# Patient Record
Sex: FEMALE | Race: WHITE | Employment: OTHER | URBAN - NONMETROPOLITAN AREA
[De-identification: names, ages, dates, MRNs, and addresses within clinical notes are randomized per-mention and may not be internally consistent; named-entity substitution may affect disease eponyms.]

---

## 2018-02-23 ENCOUNTER — HOSPITAL ENCOUNTER (EMERGENCY)
Age: 68
Discharge: HOME | End: 2018-02-23
Payer: MEDICARE

## 2018-02-23 VITALS
HEART RATE: 87 BPM | WEIGHT: 168 LBS | DIASTOLIC BLOOD PRESSURE: 69 MMHG | BODY MASS INDEX: 27.96 KG/M2 | SYSTOLIC BLOOD PRESSURE: 152 MMHG | RESPIRATION RATE: 18 BRPM | TEMPERATURE: 97.9 F | OXYGEN SATURATION: 98 %

## 2018-02-23 DIAGNOSIS — E86.0 DEHYDRATION: ICD-10-CM

## 2018-02-23 DIAGNOSIS — H61.23 BILATERAL IMPACTED CERUMEN: ICD-10-CM

## 2018-02-23 DIAGNOSIS — A09 ACUTE INFECTIVE GASTROENTERITIS: Primary | ICD-10-CM

## 2018-02-23 LAB
BILIRUBIN URINE: ABNORMAL
BLOOD, URINE: NEGATIVE
CHARACTER, URINE: CLEAR
COLOR: YELLOW
GLUCOSE, URINE: NEGATIVE MG/DL
KETONES, URINE: NEGATIVE
LEUKOCYTES, UA: NEGATIVE
NITRATE, UA: NEGATIVE
PH UA: 5 (ref 5–9)
PROTEIN UA: ABNORMAL MG/DL
REFLEX TO URINE C & S: ABNORMAL
SPECIFIC GRAVITY UA: >= 1.03 (ref 1–1.03)
UROBILINOGEN, URINE: 0.2 EU/DL (ref 0–1)

## 2018-02-23 PROCEDURE — 99202 OFFICE O/P NEW SF 15 MIN: CPT | Performed by: NURSE PRACTITIONER

## 2018-02-23 PROCEDURE — 81003 URINALYSIS AUTO W/O SCOPE: CPT

## 2018-02-23 PROCEDURE — 99214 OFFICE O/P EST MOD 30 MIN: CPT

## 2018-02-23 PROCEDURE — 6360000002 HC RX W HCPCS: Performed by: NURSE PRACTITIONER

## 2018-02-23 RX ORDER — CEFDINIR 300 MG/1
300 CAPSULE ORAL 2 TIMES DAILY
Qty: 14 CAPSULE | Refills: 0 | Status: SHIPPED | OUTPATIENT
Start: 2018-02-23 | End: 2018-03-02

## 2018-02-23 RX ORDER — SACCHAROMYCES BOULARDII 250 MG
250 CAPSULE ORAL DAILY
Qty: 14 CAPSULE | Refills: 0 | Status: SHIPPED | OUTPATIENT
Start: 2018-02-23 | End: 2018-03-09

## 2018-02-23 RX ORDER — HYDROCHLOROTHIAZIDE 12.5 MG/1
12.5 CAPSULE, GELATIN COATED ORAL DAILY
COMMUNITY
End: 2021-09-11

## 2018-02-23 RX ORDER — IBUPROFEN 400 MG/1
400 TABLET ORAL EVERY 6 HOURS PRN
COMMUNITY

## 2018-02-23 RX ORDER — ONDANSETRON 4 MG/1
4 TABLET, ORALLY DISINTEGRATING ORAL EVERY 8 HOURS PRN
Qty: 15 TABLET | Refills: 0 | Status: SHIPPED | OUTPATIENT
Start: 2018-02-23 | End: 2018-02-28

## 2018-02-23 RX ORDER — SUCRALFATE 1 G/1
1 TABLET ORAL 4 TIMES DAILY
Qty: 20 TABLET | Refills: 0 | Status: SHIPPED | OUTPATIENT
Start: 2018-02-23 | End: 2021-09-11

## 2018-02-23 RX ORDER — ONDANSETRON 4 MG/1
4 TABLET, ORALLY DISINTEGRATING ORAL ONCE
Status: COMPLETED | OUTPATIENT
Start: 2018-02-23 | End: 2018-02-23

## 2018-02-23 RX ADMIN — ONDANSETRON 4 MG: 4 TABLET, ORALLY DISINTEGRATING ORAL at 19:04

## 2018-02-23 ASSESSMENT — ENCOUNTER SYMPTOMS
RHINORRHEA: 1
RECENT COUGH: 1
WHEEZING: 0
COUGH: 1
ABDOMINAL DISTENTION: 0
CONSTIPATION: 0
ABDOMINAL PAIN: 1
BLOOD IN STOOL: 0
DIARRHEA: 1
RECTAL PAIN: 0
SORE THROAT: 0
VOMITING: 0
NAUSEA: 1
STRIDOR: 0
ANAL BLEEDING: 0
CHOKING: 0
CHEST TIGHTNESS: 0
APNEA: 0
SHORTNESS OF BREATH: 0

## 2018-02-23 ASSESSMENT — PAIN SCALES - GENERAL: PAINLEVEL_OUTOF10: 9

## 2018-02-23 ASSESSMENT — PAIN DESCRIPTION - FREQUENCY: FREQUENCY: CONTINUOUS

## 2018-02-23 ASSESSMENT — PAIN DESCRIPTION - DESCRIPTORS: DESCRIPTORS: ACHING

## 2018-02-23 ASSESSMENT — PAIN DESCRIPTION - PAIN TYPE: TYPE: ACUTE PAIN

## 2018-02-23 NOTE — ED PROVIDER NOTES
generalized tenderness. There is no rigidity, no rebound, no guarding and no CVA tenderness. Musculoskeletal: Normal range of motion. Neurological: She is alert and oriented to person, place, and time. Skin: Skin is warm and dry. She is not diaphoretic. Psychiatric: She has a normal mood and affect. Her behavior is normal. Judgment and thought content normal.   Nursing note and vitals reviewed. DIAGNOSTIC RESULTS   Labs:  Results for orders placed or performed during the hospital encounter of 02/23/18   UA without Microscopic, Reflex C&S   Result Value Ref Range    Glucose, Urine Negative NEGATIVE mg/dl    Bilirubin Urine Small (A) NEGATIVE    Ketones, Urine Negative NEGATIVE    Specific Gravity, UA >=1.030 1.002 - 1.03    Blood, Urine Negative NEGATIVE    pH, UA 5.00 5.0 - 9.0    Protein, UA Trace (A) NEGATIVE mg/dl    Urobilinogen, Urine 0.20 0.0 - 1.0 eu/dl    Nitrate, UA Negative NEGATIVE    LEUKOCYTES, UA Negative NEGATIVE    Color, UA Yellow STRAW-YELL    Character, Urine Clear CLEAR-SL C    REFLEX TO URINE C & S NOT INDICATED        IMAGING:  No orders to display     URGENT CARE COURSE:     Vitals:    02/23/18 1831   BP: (!) 152/69   Pulse: 87   Resp: 18   Temp: 97.9 °F (36.6 °C)   TempSrc: Oral   SpO2: 98%   Weight: 168 lb (76.2 kg)       Medications   ondansetron (ZOFRAN-ODT) disintegrating tablet 4 mg (4 mg Oral Given 2/23/18 1904)     PROCEDURES:  None  FINAL IMPRESSION      1. Acute infective gastroenteritis    2. Dehydration    3. Bilateral impacted cerumen        DISPOSITION/PLAN      These symptoms are consistent with infective gastroenteritis. I recommend Clear Liquids only next 12-24 hours. If tolerated then BRAT Diet .   Advise the patient that if worsening symptoms such as more than 6 stools per day, not voiding regularly, persistent vomiting not relieved with medication,unable to take oral fluids, high fever, severe weakness, lightheadedness or fainting, dry mucous membranes or other

## 2018-02-23 NOTE — ED TRIAGE NOTES
Pt ambulatory into esuc with c/o headache,nausea,fatigue, body aches and diarrhea for the past four days. Pt states she has had about 6 diarrhea in the past 24 hours. Pt states body aches is a pain of 9.

## 2021-09-11 ENCOUNTER — HOSPITAL ENCOUNTER (EMERGENCY)
Age: 71
Discharge: HOME | End: 2021-09-11
Payer: MEDICARE

## 2021-09-11 VITALS
HEIGHT: 65 IN | OXYGEN SATURATION: 99 % | BODY MASS INDEX: 32.32 KG/M2 | RESPIRATION RATE: 18 BRPM | SYSTOLIC BLOOD PRESSURE: 145 MMHG | HEART RATE: 98 BPM | WEIGHT: 194 LBS | DIASTOLIC BLOOD PRESSURE: 74 MMHG | TEMPERATURE: 98.1 F

## 2021-09-11 DIAGNOSIS — K52.9 GASTROENTERITIS: Primary | ICD-10-CM

## 2021-09-11 DIAGNOSIS — J40 BRONCHITIS: ICD-10-CM

## 2021-09-11 LAB — SARS-COV-2, NAA: NOT  DETECTED

## 2021-09-11 PROCEDURE — 87635 SARS-COV-2 COVID-19 AMP PRB: CPT

## 2021-09-11 PROCEDURE — 99203 OFFICE O/P NEW LOW 30 MIN: CPT

## 2021-09-11 PROCEDURE — 99203 OFFICE O/P NEW LOW 30 MIN: CPT | Performed by: NURSE PRACTITIONER

## 2021-09-11 RX ORDER — METHYLPREDNISOLONE 4 MG/1
TABLET ORAL
Qty: 1 KIT | Refills: 0 | Status: SHIPPED | OUTPATIENT
Start: 2021-09-11 | End: 2021-09-17

## 2021-09-11 RX ORDER — CEFDINIR 300 MG/1
300 CAPSULE ORAL 2 TIMES DAILY
Qty: 20 CAPSULE | Refills: 0 | Status: SHIPPED | OUTPATIENT
Start: 2021-09-11 | End: 2021-09-21

## 2021-09-11 ASSESSMENT — ENCOUNTER SYMPTOMS
VOMITING: 0
COUGH: 1
RHINORRHEA: 0
NAUSEA: 1
SHORTNESS OF BREATH: 0
WHEEZING: 1
DIARRHEA: 1
ABDOMINAL DISTENTION: 0
CHEST TIGHTNESS: 0
SINUS PRESSURE: 0
ABDOMINAL PAIN: 0

## 2021-09-11 NOTE — ED PROVIDER NOTES
[phone]       Chief Complaint   Patient presents with    Cough    Diarrhea    Nausea       Nurses Notes reviewed and I agree except as noted in the HPI. HISTORY OF PRESENT ILLNESS   Leslee Chand is a 66 y.o. female who presents Pt with covid like symptoms through the week with cough, congestion, sinus pressure and pain. Now with diarrhea and nausea x 2 days. Watery diarrhea. The history is provided by the patient. Diarrhea  Quality:  Mucous and watery  Severity:  Moderate  Onset quality:  Sudden  Duration:  2 days  Timing:  Intermittent  Progression:  Unchanged  Relieved by:  Nothing  Worsened by:  Change in diet and liquids  Ineffective treatments:  Liquids and change in diet  Associated symptoms: headaches    Associated symptoms: no abdominal pain and no vomiting    Associated symptoms comment:  Nausea    Headaches:     Severity:  Moderate    Onset quality:  Gradual    Duration:  1 week    Timing:  Intermittent    Progression:  Waxing and waning    Chronicity:  New  Risk factors: sick contacts    Risk factors: no recent antibiotic use, no suspicious food intake and no travel to endemic areas          REVIEW OF SYSTEMS     Review of Systems   Constitutional: Positive for activity change, appetite change and fatigue. HENT: Positive for congestion. Negative for postnasal drip, rhinorrhea, sinus pressure and sneezing. Respiratory: Positive for cough and wheezing. Negative for chest tightness and shortness of breath. Cardiovascular: Negative. Gastrointestinal: Positive for diarrhea and nausea. Negative for abdominal distention, abdominal pain and vomiting. Endocrine: Negative. Genitourinary: Negative. Musculoskeletal: Negative. Skin: Negative. Allergic/Immunologic: Negative for environmental allergies and food allergies. Neurological: Positive for headaches. Negative for weakness.    Hematological: Negative for adenopathy. Does not bruise/bleed easily. Psychiatric/Behavioral: Negative. PAST MEDICAL HISTORY         Diagnosis Date    Anxiety     Depression     Hyperlipidemia        SURGICAL HISTORY     Patient  has a past surgical history that includes knee surgery; back surgery; Hysterectomy; Cholecystectomy; and Carpal tunnel release. CURRENT MEDICATIONS       Discharge Medication List as of 9/11/2021  8:53 AM      CONTINUE these medications which have NOT CHANGED    Details   NONFORMULARY Medication for fibromyalgiaHistorical Med      bismuth subsalicylate (PEPTO BISMOL) 262 MG/15ML suspension Take 30 mLs by mouth every 6 hours as needed for IndigestionHistorical Med      ibuprofen (ADVIL;MOTRIN) 400 MG tablet Take 400 mg by mouth every 6 hours as needed for PainHistorical Med      venlafaxine (EFFEXOR-XR) 150 MG XR capsule Take 150 mg by mouth daily Pt takes two pills in am, Disp-60 capsule, R-5Historical Med      clorazepate (TRANXENE) 15 MG tablet Take 15 mg by mouth nightlyHistorical Med      famotidine (PEPCID) 20 MG tablet Take 20 mg by mouth daily      zolpidem (AMBIEN) 10 MG tablet Take by mouth nightly as needed for Sleep             ALLERGIES     Patient is has No Known Allergies. FAMILY HISTORY     Patient'sfamily history is not on file. SOCIAL HISTORY     Patient  reports that she has been smoking cigarettes. She has been smoking about 0.50 packs per day. She has never used smokeless tobacco. She reports that she does not drink alcohol and does not use drugs. PHYSICAL EXAM     ED TRIAGE VITALS  BP: (!) 145/74, Temp: 98.1 °F (36.7 °C), Pulse: 98, Resp: 18, SpO2: 99 %  Physical Exam  Vitals and nursing note reviewed. Constitutional:       Appearance: She is obese. She is ill-appearing. HENT:      Head: Normocephalic.       Right Ear: Tympanic membrane, ear canal and external ear normal.      Left Ear: Tympanic membrane, ear canal and external ear normal.      Nose: Nose normal. No rhinorrhea. Mouth/Throat:      Pharynx: Oropharynx is clear. Eyes:      Conjunctiva/sclera: Conjunctivae normal.   Cardiovascular:      Rate and Rhythm: Normal rate and regular rhythm. Pulses: Normal pulses. Pulmonary:      Effort: Pulmonary effort is normal.      Breath sounds: Wheezing ( bilateal upper airways) present. Abdominal:      General: Abdomen is flat. Bowel sounds are normal. There is distension. Palpations: There is no mass. Tenderness: There is no abdominal tenderness. There is no guarding. Musculoskeletal:         General: Normal range of motion. Cervical back: Normal range of motion and neck supple. No tenderness. Lymphadenopathy:      Cervical: No cervical adenopathy. Skin:     General: Skin is warm and dry. Capillary Refill: Capillary refill takes 2 to 3 seconds. Coloration: Skin is pale. Neurological:      General: No focal deficit present. Mental Status: She is alert and oriented to person, place, and time. Mental status is at baseline. Psychiatric:         Mood and Affect: Mood normal.         Behavior: Behavior normal.         Thought Content: Thought content normal.         Judgment: Judgment normal.         DIAGNOSTIC RESULTS   Labs:   Results for orders placed or performed during the hospital encounter of 09/11/21   COVID-19, Rapid   Result Value Ref Range    SARS-CoV-2, MAYDA NOT  DETECTED NOT DETECTED       IMAGING:  No orders to display     URGENT CARE COURSE:     Vitals:    09/11/21 0810   BP: (!) 145/74   Pulse: 98   Resp: 18   Temp: 98.1 °F (36.7 °C)   TempSrc: Temporal   SpO2: 99%   Weight: 194 lb (88 kg)   Height: 5' 5\" (1.651 m)       Medications - No data to display  PROCEDURES:  None  FINALIMPRESSION    I have reviewed the patient's medical history in detail and updated the computerized patient record. HPI/ROS per the patient and caregiver. Overall non toxic in appearance. Answers questions appropriately.    Conditions discussed and addressed this visit include:     Patient appears ill but non-toxic and fairly well hydrated. Patient did test neg for covid 19. Patient is to take medications as directed. Continue all home medications. Potential side effects of the medications were reviewed with the patient in detail. The patient can increase fluids. Discussed using electrolyte replacement. The patient can have Tylenol or Motrin for pain and fever as needed. Discussed with patient signs and symptoms that would require emergent evaluation and treatment. The patient will follow-up with their family physician or go to the ER if they develop any worsening symptoms. The patient was discharged in stable condition      1. Gastroenteritis    2.  Bronchitis        DISPOSITION/PLAN   DISPOSITION      PATIENT REFERRED TO:  Kathy Valdovinos MD  [address] [place] [address]  [phone]    In 3 days  As needed, If symptoms worsen    DISCHARGE MEDICATIONS:  Discharge Medication List as of 9/11/2021  8:53 AM      START taking these medications    Details   cefdinir (OMNICEF) 300 MG capsule Take 1 capsule by mouth 2 times daily for 10 days, Disp-20 capsule, R-0Normal      methylPREDNISolone (MEDROL, GINNA,) 4 MG tablet Take by mouth., Disp-1 kit, R-0Normal           Discharge Medication List as of 9/11/2021  8:53 AM          RUDY Sen CNP, APRN - CNP  09/13/21 5743

## 2022-05-20 ENCOUNTER — HOSPITAL ENCOUNTER (OUTPATIENT)
Age: 72
Discharge: HOME | End: 2022-05-20
Payer: MEDICARE

## 2022-05-20 ENCOUNTER — HOSPITAL ENCOUNTER (OUTPATIENT)
Dept: GENERAL RADIOLOGY | Age: 72
Discharge: HOME | End: 2022-05-20
Payer: MEDICARE

## 2022-05-20 DIAGNOSIS — Z01.811 PRE-OP CHEST EXAM: ICD-10-CM

## 2022-05-20 LAB
ALBUMIN SERPL-MCNC: 4.6 G/DL (ref 3.5–5.1)
ALP BLD-CCNC: 117 U/L (ref 38–126)
ALT SERPL-CCNC: 19 U/L (ref 11–66)
ANION GAP SERPL CALCULATED.3IONS-SCNC: 9 MEQ/L (ref 8–16)
AST SERPL-CCNC: 28 U/L (ref 5–40)
BILIRUB SERPL-MCNC: 0.2 MG/DL (ref 0.3–1.2)
BUN BLDV-MCNC: 21 MG/DL (ref 7–22)
CALCIUM SERPL-MCNC: 9.7 MG/DL (ref 8.5–10.5)
CHLORIDE BLD-SCNC: 101 MEQ/L (ref 98–111)
CO2: 29 MEQ/L (ref 23–33)
CREAT SERPL-MCNC: 1 MG/DL (ref 0.4–1.2)
ERYTHROCYTE [DISTWIDTH] IN BLOOD BY AUTOMATED COUNT: 14.9 % (ref 11.5–14.5)
ERYTHROCYTE [DISTWIDTH] IN BLOOD BY AUTOMATED COUNT: 51.6 FL (ref 35–45)
GFR SERPL CREATININE-BSD FRML MDRD: 54 ML/MIN/1.73M2
GLUCOSE BLD-MCNC: 91 MG/DL (ref 70–108)
HCT VFR BLD CALC: 38.9 % (ref 37–47)
HEMOGLOBIN: 12.2 GM/DL (ref 12–16)
MCH RBC QN AUTO: 29.3 PG (ref 26–33)
MCHC RBC AUTO-ENTMCNC: 31.4 GM/DL (ref 32.2–35.5)
MCV RBC AUTO: 93.3 FL (ref 81–99)
PLATELET # BLD: 342 THOU/MM3 (ref 130–400)
PMV BLD AUTO: 8.7 FL (ref 9.4–12.4)
POTASSIUM SERPL-SCNC: 4.9 MEQ/L (ref 3.5–5.2)
RBC # BLD: 4.17 MILL/MM3 (ref 4.2–5.4)
SODIUM BLD-SCNC: 139 MEQ/L (ref 135–145)
TOTAL PROTEIN: 7.2 G/DL (ref 6.1–8)
WBC # BLD: 6.9 THOU/MM3 (ref 4.8–10.8)

## 2022-05-20 PROCEDURE — 36415 COLL VENOUS BLD VENIPUNCTURE: CPT

## 2022-05-20 PROCEDURE — 85027 COMPLETE CBC AUTOMATED: CPT

## 2022-05-20 PROCEDURE — 93005 ELECTROCARDIOGRAM TRACING: CPT | Performed by: PODIATRIST

## 2022-05-20 PROCEDURE — 71046 X-RAY EXAM CHEST 2 VIEWS: CPT

## 2022-05-20 PROCEDURE — 80053 COMPREHEN METABOLIC PANEL: CPT

## 2022-05-21 LAB
EKG ATRIAL RATE: 97 BPM
EKG P AXIS: 75 DEGREES
EKG P-R INTERVAL: 130 MS
EKG Q-T INTERVAL: 346 MS
EKG QRS DURATION: 82 MS
EKG QTC CALCULATION (BAZETT): 439 MS
EKG R AXIS: 7 DEGREES
EKG T AXIS: 36 DEGREES
EKG VENTRICULAR RATE: 97 BPM

## 2022-05-21 PROCEDURE — 93010 ELECTROCARDIOGRAM REPORT: CPT | Performed by: INTERNAL MEDICINE

## 2022-06-08 RX ORDER — PREGABALIN 75 MG/1
75 CAPSULE ORAL NIGHTLY
COMMUNITY

## 2022-06-08 RX ORDER — ACETAMINOPHEN 500 MG
TABLET ORAL DAILY
COMMUNITY

## 2022-06-08 NOTE — PROGRESS NOTES
In preparation for their surgical procedure above patient was screened for Obstructive Sleep Apnea (MALLIKA) using the STOP-Bang Questionnaire by the Pre-Admission Testing department. This is a pre-surgical screening tool for patient safety and serves as a recommendation, this WILL NOT cause cancellation of surgery. STOP-Bang Questionnaire  * Do you currently see a pulmonologist?  No     If yes STOP, do not complete. Patient follows with Dr.     1.  Do you snore loudly (able to be heard in the next room)? No    2. Do you often feel tired or sleepy during the daytime? No       3. Has anyone ever told you that you stop breathing during your sleep? No    4. Do you have or are you being treated for high blood pressure? No      5. BMI more than 35? BMI (Calculated): 34.4        No    6. Age over 48 years? 67 y.o. Yes    7. Neck Circumference greater than 17 inches for male or 16 inches for female? Measured           (visits only)            Not Applicable    8. Gender Male? No      TOTAL SCORE: 1    MALLIKA - Low Risk : Yes to 0 - 2 questions  MALLIKA - Intermediate Risk : Yes to 3 - 4 questions  MALLIKA - High Risk : Yes to 5 - 8 questions    Adapted from:   STOP Questionnaire: A Tool to Screen Patients for Obstructive Sleep Apnea   MIGNON Kyle.P.C., Abhay Campuzano M.B.B.S., Efren Grove M.D., Odalis Farah. Ivan Mueller, Ph.D., HANS Figueroa.B.B.S., Oscar Muller, M.Sc., Neal Lowry M.D., Raakn MIGNON Barr.P.C.    Anesthesiology 2008; [id number]:[id number] Copyright 2008, the [address] of [employer]est[employer], [facility].   ----------------------------------------------------------------------------------------------------------------

## 2022-06-08 NOTE — PROGRESS NOTES
NPO after midnight  [employer] and drivers license  Wear comfortable clean clothing  Do not bring jewelry  Shower night before and morning of surgery with a liquid antibacterial soap  Bring list of medications with dosage and how often taken  Follow all instructions given by your physician   needed at discharge  Please limit to 2 visitors for surgery  You must have a responsible adult with you day of surgery and for 24 hours after surgery  Please bring and wear mask  Call [phone] for any questions

## 2022-06-14 ENCOUNTER — ANESTHESIA EVENT (OUTPATIENT)
Dept: OPERATING ROOM | Age: 72
End: 2022-06-14
Payer: MEDICARE

## 2022-06-15 ENCOUNTER — ANESTHESIA (OUTPATIENT)
Dept: OPERATING ROOM | Age: 72
End: 2022-06-15
Payer: MEDICARE

## 2022-06-15 ENCOUNTER — HOSPITAL ENCOUNTER (OUTPATIENT)
Age: 72
Setting detail: OUTPATIENT SURGERY
Discharge: HOME | End: 2022-06-15
Attending: PODIATRIST | Admitting: PODIATRIST
Payer: MEDICARE

## 2022-06-15 VITALS
TEMPERATURE: 96.8 F | HEIGHT: 66 IN | DIASTOLIC BLOOD PRESSURE: 70 MMHG | OXYGEN SATURATION: 96 % | WEIGHT: 201 LBS | BODY MASS INDEX: 32.3 KG/M2 | RESPIRATION RATE: 16 BRPM | SYSTOLIC BLOOD PRESSURE: 151 MMHG | HEART RATE: 94 BPM

## 2022-06-15 PROCEDURE — 6360000002 HC RX W HCPCS: Performed by: STUDENT IN AN ORGANIZED HEALTH CARE EDUCATION/TRAINING PROGRAM

## 2022-06-15 PROCEDURE — 2500000003 HC RX 250 WO HCPCS: Performed by: PODIATRIST

## 2022-06-15 PROCEDURE — 2500000003 HC RX 250 WO HCPCS: Performed by: STUDENT IN AN ORGANIZED HEALTH CARE EDUCATION/TRAINING PROGRAM

## 2022-06-15 PROCEDURE — 2709999900 HC NON-CHARGEABLE SUPPLY: Performed by: PODIATRIST

## 2022-06-15 PROCEDURE — 3700000000 HC ANESTHESIA ATTENDED CARE: Performed by: PODIATRIST

## 2022-06-15 PROCEDURE — 7100000011 HC PHASE II RECOVERY - ADDTL 15 MIN: Performed by: PODIATRIST

## 2022-06-15 PROCEDURE — 3600000004 HC SURGERY LEVEL 4 BASE: Performed by: PODIATRIST

## 2022-06-15 PROCEDURE — 3700000001 HC ADD 15 MINUTES (ANESTHESIA): Performed by: PODIATRIST

## 2022-06-15 PROCEDURE — 2580000003 HC RX 258: Performed by: STUDENT IN AN ORGANIZED HEALTH CARE EDUCATION/TRAINING PROGRAM

## 2022-06-15 PROCEDURE — 3600000014 HC SURGERY LEVEL 4 ADDTL 15MIN: Performed by: PODIATRIST

## 2022-06-15 PROCEDURE — 7100000010 HC PHASE II RECOVERY - FIRST 15 MIN: Performed by: PODIATRIST

## 2022-06-15 RX ORDER — MULTIVIT WITH MINERALS/LUTEIN
250 TABLET ORAL DAILY
COMMUNITY

## 2022-06-15 RX ORDER — LIDOCAINE HYDROCHLORIDE 20 MG/ML
INJECTION, SOLUTION EPIDURAL; INFILTRATION; INTRACAUDAL; PERINEURAL PRN
Status: DISCONTINUED | OUTPATIENT
Start: 2022-06-15 | End: 2022-06-15 | Stop reason: SDUPTHER

## 2022-06-15 RX ORDER — PROPOFOL 10 MG/ML
INJECTION, EMULSION INTRAVENOUS PRN
Status: DISCONTINUED | OUTPATIENT
Start: 2022-06-15 | End: 2022-06-15 | Stop reason: SDUPTHER

## 2022-06-15 RX ORDER — HYDROCODONE BITARTRATE AND ACETAMINOPHEN 5; 325 MG/1; MG/1
2 TABLET ORAL EVERY 4 HOURS PRN
Status: DISCONTINUED | OUTPATIENT
Start: 2022-06-15 | End: 2022-06-15 | Stop reason: HOSPADM

## 2022-06-15 RX ORDER — SODIUM CHLORIDE 0.9 % (FLUSH) 0.9 %
5-40 SYRINGE (ML) INJECTION EVERY 12 HOURS SCHEDULED
Status: DISCONTINUED | OUTPATIENT
Start: 2022-06-15 | End: 2022-06-15 | Stop reason: HOSPADM

## 2022-06-15 RX ORDER — SODIUM CHLORIDE 0.9 % (FLUSH) 0.9 %
5-40 SYRINGE (ML) INJECTION PRN
Status: DISCONTINUED | OUTPATIENT
Start: 2022-06-15 | End: 2022-06-15 | Stop reason: HOSPADM

## 2022-06-15 RX ORDER — ZINC GLUCONATE 50 MG
50 TABLET ORAL DAILY
COMMUNITY

## 2022-06-15 RX ORDER — SODIUM CHLORIDE 9 MG/ML
INJECTION, SOLUTION INTRAVENOUS CONTINUOUS
Status: DISCONTINUED | OUTPATIENT
Start: 2022-06-15 | End: 2022-06-15 | Stop reason: HOSPADM

## 2022-06-15 RX ORDER — SODIUM CHLORIDE 9 MG/ML
INJECTION, SOLUTION INTRAVENOUS CONTINUOUS PRN
Status: DISCONTINUED | OUTPATIENT
Start: 2022-06-15 | End: 2022-06-15 | Stop reason: SDUPTHER

## 2022-06-15 RX ORDER — MEPERIDINE HYDROCHLORIDE 25 MG/ML
12.5 INJECTION INTRAMUSCULAR; INTRAVENOUS; SUBCUTANEOUS EVERY 5 MIN PRN
Status: DISCONTINUED | OUTPATIENT
Start: 2022-06-15 | End: 2022-06-15 | Stop reason: HOSPADM

## 2022-06-15 RX ORDER — ONDANSETRON 4 MG/1
4 TABLET, ORALLY DISINTEGRATING ORAL EVERY 8 HOURS PRN
Status: DISCONTINUED | OUTPATIENT
Start: 2022-06-15 | End: 2022-06-15 | Stop reason: HOSPADM

## 2022-06-15 RX ORDER — BUPIVACAINE HYDROCHLORIDE AND EPINEPHRINE 5; 5 MG/ML; UG/ML
INJECTION, SOLUTION EPIDURAL; INTRACAUDAL; PERINEURAL PRN
Status: DISCONTINUED | OUTPATIENT
Start: 2022-06-15 | End: 2022-06-15 | Stop reason: ALTCHOICE

## 2022-06-15 RX ORDER — ROSUVASTATIN CALCIUM 5 MG/1
5 TABLET, COATED ORAL DAILY
COMMUNITY

## 2022-06-15 RX ORDER — ONDANSETRON 2 MG/ML
4 INJECTION INTRAMUSCULAR; INTRAVENOUS EVERY 6 HOURS PRN
Status: DISCONTINUED | OUTPATIENT
Start: 2022-06-15 | End: 2022-06-15 | Stop reason: HOSPADM

## 2022-06-15 RX ORDER — FENTANYL CITRATE 50 UG/ML
50 INJECTION, SOLUTION INTRAMUSCULAR; INTRAVENOUS EVERY 5 MIN PRN
Status: DISCONTINUED | OUTPATIENT
Start: 2022-06-15 | End: 2022-06-15 | Stop reason: HOSPADM

## 2022-06-15 RX ORDER — SODIUM CHLORIDE 9 MG/ML
INJECTION, SOLUTION INTRAVENOUS PRN
Status: DISCONTINUED | OUTPATIENT
Start: 2022-06-15 | End: 2022-06-15 | Stop reason: HOSPADM

## 2022-06-15 RX ORDER — VITAMIN E 268 MG
400 CAPSULE ORAL DAILY
COMMUNITY

## 2022-06-15 RX ORDER — HYDROXYZINE HYDROCHLORIDE 25 MG/1
25 TABLET, FILM COATED ORAL DAILY
COMMUNITY

## 2022-06-15 RX ORDER — ONDANSETRON 2 MG/ML
4 INJECTION INTRAMUSCULAR; INTRAVENOUS
Status: DISCONTINUED | OUTPATIENT
Start: 2022-06-15 | End: 2022-06-15 | Stop reason: HOSPADM

## 2022-06-15 RX ORDER — MORPHINE SULFATE 2 MG/ML
4 INJECTION, SOLUTION INTRAMUSCULAR; INTRAVENOUS
Status: DISCONTINUED | OUTPATIENT
Start: 2022-06-15 | End: 2022-06-15 | Stop reason: HOSPADM

## 2022-06-15 RX ORDER — MORPHINE SULFATE 2 MG/ML
2 INJECTION, SOLUTION INTRAMUSCULAR; INTRAVENOUS
Status: DISCONTINUED | OUTPATIENT
Start: 2022-06-15 | End: 2022-06-15 | Stop reason: HOSPADM

## 2022-06-15 RX ORDER — HYDROCODONE BITARTRATE AND ACETAMINOPHEN 5; 325 MG/1; MG/1
1 TABLET ORAL EVERY 4 HOURS PRN
Status: DISCONTINUED | OUTPATIENT
Start: 2022-06-15 | End: 2022-06-15 | Stop reason: HOSPADM

## 2022-06-15 RX ORDER — HYDRALAZINE HYDROCHLORIDE 20 MG/ML
10 INJECTION INTRAMUSCULAR; INTRAVENOUS
Status: DISCONTINUED | OUTPATIENT
Start: 2022-06-15 | End: 2022-06-15 | Stop reason: HOSPADM

## 2022-06-15 RX ORDER — ERGOCALCIFEROL (VITAMIN D2) 10 MCG
1 TABLET ORAL DAILY
COMMUNITY

## 2022-06-15 RX ORDER — ACETAMINOPHEN 325 MG/1
650 TABLET ORAL EVERY 4 HOURS PRN
Status: DISCONTINUED | OUTPATIENT
Start: 2022-06-15 | End: 2022-06-15 | Stop reason: HOSPADM

## 2022-06-15 RX ORDER — DIPHENHYDRAMINE HYDROCHLORIDE 50 MG/ML
12.5 INJECTION INTRAMUSCULAR; INTRAVENOUS
Status: DISCONTINUED | OUTPATIENT
Start: 2022-06-15 | End: 2022-06-15 | Stop reason: HOSPADM

## 2022-06-15 RX ADMIN — PROPOFOL 20 MG: 10 INJECTION, EMULSION INTRAVENOUS at 07:54

## 2022-06-15 RX ADMIN — PROPOFOL 30 MCG/KG/MIN: 10 INJECTION, EMULSION INTRAVENOUS at 07:42

## 2022-06-15 RX ADMIN — PROPOFOL 70 MG: 10 INJECTION, EMULSION INTRAVENOUS at 07:35

## 2022-06-15 RX ADMIN — SODIUM CHLORIDE: 9 INJECTION, SOLUTION INTRAVENOUS at 08:20

## 2022-06-15 RX ADMIN — LIDOCAINE HYDROCHLORIDE 80 MG: 20 INJECTION, SOLUTION EPIDURAL; INFILTRATION; INTRACAUDAL; PERINEURAL at 07:35

## 2022-06-15 RX ADMIN — PROPOFOL 30 MG: 10 INJECTION, EMULSION INTRAVENOUS at 07:38

## 2022-06-15 RX ADMIN — CEFAZOLIN SODIUM 2000 MG: 10 INJECTION, POWDER, FOR SOLUTION INTRAVENOUS at 07:37

## 2022-06-15 ASSESSMENT — PAIN SCALES - GENERAL
PAINLEVEL_OUTOF10: 0
PAINLEVEL_OUTOF10: 0

## 2022-06-15 NOTE — OP NOTE
Operative Note      Patient: Yisel Hugo  YOB: 1950  MRN: [id number]    Date of Procedure: 6/15/2022    Pre-Op Diagnosis: Painful hammertoe contracture of 2nd digit, right foot    Post-Op Diagnosis: Same       Procedure(s):  AMPUTATION OF 2ND DIGIT @ MPJ, RIGHT FOOT    Surgeon(s):  Hamzah Menjivar DPM    Assistant:  Resident: Sarai Garg DPM, Damaris Lucas MS4    Anesthesia: Monitor Anesthesia Care    Estimated Blood Loss (mL): Minimal    Hemostasis: Right ankle tourniquet @ 250 mmHg    Suture: 3-0 Prolene    Injectables: 18 cc of 1/2% Marcaine w/ Epi    Complications: None    Specimens: None    Implants: None      Drains: None    Findings: Same as Pre-Op Diagnosis    Detailed Description of Procedure: Indications: Patient is a 67 y.o. female with a chief complaint of painful hammertoe contracture of 2nd digit, right foot who is being seen and treated by Dr. Columba Scales. At this time all conservative options have been exhausted and surgical intervention is necessary. The procedure has been explained to the patient and they understand the risks, benefits and possible complications including but not limited to pain, infection, failure, irritation, need for additional surgery and loss of life. No promises have been made as to surgical outcome. Procedure: The patient was transported from the pre-op holding to the operating room and placed in a supine position. A pneumatic ankle tourniquet was applied to the right ankle. A pre-operative injection of 18 cc of 1/2% Marcaine w/ Epi was injected into the right foot in a field block. The right foot was then prepped and draped in the normal aspetic manner. The right foot was then exsanguinated with an esmark and the tourniquet was inflated to 250 mmHg. Attention was directed to second metatarsophalangeal joint of the right foot. A skin marker was used to create a racquet type incision line around the joint.  Forceps were then applied to the digit to check for proper anesthesia. A towel clamp was then used to grasp the middle aspect of the toe. A full thickness incision was made down to the level of bone. The metatarsophalangeal joint was visualized, and all ligamentous and capsular structures were released. The dis-articulated digit was removed. The pneumatic ankle tourniquet was then deflated and an immediate hyperemic response was noted to all digits of the right foot. Bleeding vessels were cauterized at this time. The incision was flushed with copious amounts of sterile saline. The skin was closed using 3-0 prolene. The incision was dressed with adaptic, 4x4's, kerlix, stockinette and coban. A post op shoe was then applied. The patient was transported to the PACU with VSS and NVS intact to all remaining digits and incision site of the right foot. No complications were noted throughout the procedure. The patient is to be discharged per PACU protocol. The patient is to follow-up with Dr. Price Neal in the office. Dr. Manuel Neal DPM    Electronically signed by Calli Stokes DPM on 6/15/2022 at 8:22 AM

## 2022-06-15 NOTE — ANESTHESIA POSTPROCEDURE EVALUATION
Department of Anesthesiology  Postprocedure Note    Patient: Shellie Gallegos  MRN: [id number]  YOB: 1950  Date of evaluation: 6/15/2022  Time:  9:37 AM     Procedure Summary     Date: 06/15/22 Room / Location: [address] [place] [address] 01 / [address]    Anesthesia Start: 0730 Anesthesia Stop: 1805    Procedure: AMPUTATION 2ND DIGIT RIGHT FOOT MPJ (Right Toes) Diagnosis:       Contracture of joint of right foot      Hallux rigidus of right foot      Hammertoe of right foot      Primary osteoarthritis of right foot    Surgeons: Amna Obrien DPM Responsible Provider: Srinivasa Smith DO    Anesthesia Type: MAC ASA Status: 2          Anesthesia Type: No value filed. Sarah Phase I:      Sarah Phase II: Sarah Score: 10    Last vitals: Reviewed and per EMR flowsheets.        Anesthesia Post Evaluation    Patient location during evaluation: bedside  Patient participation: complete - patient participated  Level of consciousness: awake and alert  Airway patency: patent  Nausea & Vomiting: no vomiting and no nausea  Complications: no  Cardiovascular status: hemodynamically stable  Respiratory status: acceptable  Hydration status: stable

## 2022-06-15 NOTE — PROGRESS NOTES
1223- Pt arrived to PACU phase 2, MAC, Dr Douglas Avilez and Dimas Torres RN at bedside for report. Pt has coban, 4 * 4 dressing to foot with post op shoe on. Pt hooked up to monitor, VSS. Pt denies pain. 0830- IV capped off, snack given. 2212- Pt doing well denies pain, feels like more awake now.   2631- IV removed  2950- Pt discharged instructions given verbalized understanding family at bedside  [phone]- Pt discharged taken to car in wheelchair with this RN

## 2022-06-15 NOTE — ANESTHESIA PRE PROCEDURE
Department of Anesthesiology  Preprocedure Note       Name:  [facility]   Age:  67 y.o.  :  1950                                          MRN:  [id number]         Date:  6/15/2022      Surgeon: Bridget Washburn):  Hamzah Fan, DPM    Procedure: Procedure(s):  AMPUTATION 2ND DIGIT RIGHT FOOT MPJ    Medications prior to admission:   Prior to Admission medications    Medication Sig Start Date End Date Taking? Authorizing Provider   hydrOXYzine HCl (ATARAX) 25 MG tablet Take 25 mg by mouth daily   Yes Historical Provider, MD   rosuvastatin (CRESTOR) 5 MG tablet Take 5 mg by mouth daily   Yes Historical Provider, MD   Multiple Vitamins-Minerals (MULTIVITAL PO) Take 1 tablet by mouth daily   Yes Historical Provider, MD   Ascorbic Acid (VITAMIN C) 250 MG tablet Take 250 mg by mouth daily   Yes Historical Provider, MD   zinc 50 MG TABS tablet Take 50 mg by mouth daily   Yes Historical Provider, MD   Vitamin D, Cholecalciferol, 10 MCG (400 UNIT) TABS Take 1 tablet by mouth daily   Yes Historical Provider, MD   vitamin E 180 MG (400 UNIT) CAPS capsule Take 400 Units by mouth daily   Yes Historical Provider, MD   acetaminophen (TYLENOL) 500 MG tablet Take by mouth daily 2 tab   Yes Historical Provider, MD   pregabalin (LYRICA) 75 MG capsule Take 75 mg by mouth nightly.    Yes Historical Provider, MD   bismuth subsalicylate (PEPTO BISMOL) 262 MG/15ML suspension Take 30 mLs by mouth every 6 hours as needed for Indigestion    Historical Provider, MD   ibuprofen (ADVIL;MOTRIN) 400 MG tablet Take 400 mg by mouth every 6 hours as needed for Pain    Historical Provider, MD   venlafaxine (EFFEXOR-XR) 150 MG XR capsule Take 150 mg by mouth daily Pt takes two pills in am 16   Susana Acosta MD   clorazepate (TRANXENE) 15 MG tablet Take 15 mg by mouth nightly 16   Historical Provider, MD   famotidine (PEPCID) 20 MG tablet Take 20 mg by mouth daily    Historical Provider, MD   zolpidem (AMBIEN) 10 MG tablet Take by mouth nightly as needed for Sleep    Historical Provider, MD       Current medications:    Current Facility-Administered Medications   Medication Dose Route Frequency Provider Last Rate Last Admin    0.9 % sodium chloride infusion   IntraVENous Continuous Ellie Ejupmathew, DPM        sodium chloride flush 0.9 % injection 5-40 mL  5-40 mL IntraVENous 2 times per day Adanival Ejupovic, DPM        sodium chloride flush 0.9 % injection 5-40 mL  5-40 mL IntraVENous PRN Adanival Ejmaryovic, DPM        0.9 % sodium chloride infusion   IntraVENous PRN Ellie Ricketts, DPM        ceFAZolin (ANCEF) 2000 mg in dextrose 5 % 50 mL IVPB  2,000 mg IntraVENous On Call to Jesus Lozano DPM           Allergies:  No Known Allergies    Problem List:    Patient Active Problem List   Diagnosis Code    Moderate episode of recurrent major depressive disorder ([facility] [address] F33.1    MELODY (generalized anxiety disorder) F41.1       Past Medical History:        Diagnosis Date    Anxiety     Depression     Fibromyalgia     GERD (gastroesophageal reflux disease)     Hyperlipidemia     PONV (postoperative nausea and vomiting)     gets migraine without coffee and then has nausea       Past Surgical History:        Procedure Laterality Date    BACK SURGERY      CARPAL TUNNEL RELEASE Bilateral     CHOLECYSTECTOMY      HYSTERECTOMY (CERVIX STATUS UNKNOWN)      JOINT REPLACEMENT Bilateral     knees    KNEE ARTHROPLASTY Bilateral        Social History:    Social History     Tobacco Use    Smoking status: Former Smoker     Packs/day: 0.50     Types: Cigarettes     Quit date: 2022     Years since quittin.3    Smokeless tobacco: Never Used   Substance Use Topics    Alcohol use:  No                                Counseling given: Not Answered      Vital Signs (Current):   Vitals:    22 1457   Weight: 200 lb (90.7 kg)   Height: 5' 4\" (1.626 m)                                              BP Readings from Last 3 Encounters:   21 (!) 145/74 02/23/18 (!) 152/69   12/18/15 157/89       NPO Status: Time of last liquid consumption: 1900                        Time of last solid consumption: 1800                        Date of last liquid consumption: 06/14/22                        Date of last solid food consumption: 06/14/22    BMI:   Wt Readings from Last 3 Encounters:   06/08/22 200 lb (90.7 kg)   09/11/21 194 lb (88 kg)   02/23/18 168 lb (76.2 kg)     Body mass index is 34.33 kg/m². CBC:   Lab Results   Component Value Date    WBC 6.9 05/20/2022    RBC 4.17 05/20/2022    HGB 12.2 05/20/2022    HCT 38.9 05/20/2022    MCV 93.3 05/20/2022    RDW 14.6 03/01/2021     05/20/2022       CMP:   Lab Results   Component Value Date     05/20/2022    K 4.9 05/20/2022     05/20/2022    CO2 29 05/20/2022    BUN 21 05/20/2022    CREATININE 1.0 05/20/2022    AGRATIO 1.4 03/01/2021    LABGLOM 54 05/20/2022    GLUCOSE 91 05/20/2022    GLUCOSE 109 03/01/2021    PROT 7.2 05/20/2022    CALCIUM 9.7 05/20/2022    BILITOT 0.2 05/20/2022    ALKPHOS 117 05/20/2022    AST 28 05/20/2022    ALT 19 05/20/2022       POC Tests: No results for input(s): POCGLU, POCNA, POCK, POCCL, POCBUN, POCHEMO, POCHCT in the last 72 hours. Coags: No results found for: PROTIME, INR, APTT    HCG (If Applicable): No results found for: PREGTESTUR, PREGSERUM, HCG, HCGQUANT     ABGs: No results found for: PHART, PO2ART, NYP4KPS, GXW2CWD, BEART, F1BEUKTE     Type & Screen (If Applicable):  No results found for: LABABO, LABRH    Drug/Infectious Status (If Applicable):  No results found for: HIV, HEPCAB    COVID-19 Screening (If Applicable):   Lab Results   Component Value Date    COVID19 NOT  DETECTED 09/11/2021           Anesthesia Evaluation     history of anesthetic complications: PONV.   Airway: Mallampati: II  TM distance: >3 FB   Neck ROM: full  Mouth opening: > = 3 FB   Dental:          Pulmonary:normal exam        (-) COPD and asthma Cardiovascular:Negative CV ROS  Exercise tolerance: good (>4 METS),       (-) hypertension, past MI and CAD                Neuro/Psych:   (+) neuromuscular disease:, depression/anxiety    (-) seizures and CVA           GI/Hepatic/Renal:   (+) GERD: well controlled,      (-) liver disease and no renal disease       Endo/Other:        (-) diabetes mellitus, hypothyroidism, hyperthyroidism               Abdominal:   (+) obese,           Vascular:     - DVT and PE. Other Findings:           Anesthesia Plan      MAC     ASA 2       Induction: intravenous. Anesthetic plan and risks discussed with patient.                         Monie Salmon DO   6/15/2022

## 2022-06-15 NOTE — BRIEF OP NOTE
Brief Postoperative Note      Patient: Steff Nagy  YOB: 1950  MRN: [id number]    Date of Procedure: 6/15/2022    Pre-Op Diagnosis: Painful hammertoe contracture of 2nd digit, right foot    Post-Op Diagnosis: Same       Procedure(s):  AMPUTATION OF 2ND DIGIT @ MPJ, RIGHT FOOT    Surgeon(s):  Hamazh Lucero DPM    Assistant:  Resident: Narcisa Rodriguez DPM, Kalli Hunter, MS4    Anesthesia: Monitor Anesthesia Care    Estimated Blood Loss (mL): Minimal    Hemostasis: Right ankle tourniquet @ 250 mmHg    Suture: 3-0 Prolene    Injectables: 18 cc of 1/2% Marcaine w/ Epi    Complications: None    Specimens: None    Implants: None      Drains: None    Findings: Same as Pre-Op Diagnosis    Electronically signed by Narcisa Rodriguez DPM on 6/15/2022 at 8:22 AM

## 2022-06-15 NOTE — H&P
[facility]  History and Physical Update    Pt Name: Haritha Stokes  MRN: [id number]  YOB: 1950  Date of evaluation: 6/15/2022    [x] I have examined the patient and reviewed the H&P/Consult and there are no changes to the patient or plans.     [] I have examined the patient and reviewed the H&P/Consult and have noted the following changes:        Steve Multani DPM DPM  Electronically signed 6/15/2022 at 7:24 AM

## 2022-06-17 ENCOUNTER — HOSPITAL ENCOUNTER (EMERGENCY)
Age: 72
Discharge: HOME | End: 2022-06-17
Payer: MEDICARE

## 2022-06-17 VITALS
HEIGHT: 66 IN | WEIGHT: 201 LBS | SYSTOLIC BLOOD PRESSURE: 167 MMHG | HEART RATE: 113 BPM | OXYGEN SATURATION: 95 % | RESPIRATION RATE: 16 BRPM | BODY MASS INDEX: 32.3 KG/M2 | DIASTOLIC BLOOD PRESSURE: 102 MMHG | TEMPERATURE: 98.2 F

## 2022-06-17 DIAGNOSIS — Z51.89 VISIT FOR WOUND CHECK: Primary | ICD-10-CM

## 2022-06-17 PROCEDURE — 99282 EMERGENCY DEPT VISIT SF MDM: CPT

## 2022-06-17 ASSESSMENT — ENCOUNTER SYMPTOMS
NAUSEA: 0
SHORTNESS OF BREATH: 0
EYE PAIN: 0
ABDOMINAL PAIN: 0
SORE THROAT: 0
VOMITING: 0
CHEST TIGHTNESS: 0

## 2022-06-17 ASSESSMENT — PAIN - FUNCTIONAL ASSESSMENT: PAIN_FUNCTIONAL_ASSESSMENT: NONE - DENIES PAIN

## 2022-06-17 NOTE — ED PROVIDER NOTES
[facility] COMPLAINT       Chief Complaint   Patient presents with    Wound Check    Dressing Change       Nurses Notes reviewed and I agree except as notedin the HPI. HISTORY OF PRESENT ILLNESS    Jackie Aguillon is a 67 y.o. female who presents for a wound dressing change. The patient states that she had her right 2nd toe amputated 2 days ago on 06/15 and she accidentally got the dressing wet this morning when she took a shower. She states that she had the foot wrapped really well and is unsure how water got on it. She says that she has noticed a  little blood soak through the bandage as well after it became wet. She was told that if the dressing became wet or if she noticed bleeding to be seen for evaluation and dressing change. She states that she is supposed to follow up with her podiatrist in 3 days on 06/20. Patient denies current pain, fever, chills. REVIEW OF SYSTEMS     Review of Systems   Constitutional: Negative for activity change, chills, diaphoresis, fatigue and fever. HENT: Negative for ear pain and sore throat. Eyes: Negative for pain. Respiratory: Negative for chest tightness and shortness of breath. Cardiovascular: Negative for chest pain, palpitations and leg swelling. Gastrointestinal: Negative for abdominal pain, nausea and vomiting. Musculoskeletal:        Amputation of 2nd digit on the right foot   Neurological: Negative for dizziness, syncope, weakness, light-headedness and headaches. PAST MEDICAL HISTORY    has a past medical history of Anxiety, Depression, Fibromyalgia, GERD (gastroesophageal reflux disease), Hyperlipidemia, and PONV (postoperative nausea and vomiting). SURGICAL HISTORY      has a past surgical history that includes back surgery; Hysterectomy; Cholecystectomy; Carpal tunnel release (Bilateral);  Knee Arthroplasty (Bilateral); joint replacement (Bilateral); and Toe amputation (Right, 6/15/2022). CURRENT MEDICATIONS       Discharge Medication List as of 2022  9:09 AM      CONTINUE these medications which have NOT CHANGED    Details   hydrOXYzine HCl (ATARAX) 25 MG tablet Take 25 mg by mouth dailyHistorical Med      rosuvastatin (CRESTOR) 5 MG tablet Take 5 mg by mouth dailyHistorical Med      Multiple Vitamins-Minerals (MULTIVITAL PO) Take 1 tablet by mouth dailyHistorical Med      Ascorbic Acid (VITAMIN C) 250 MG tablet Take 250 mg by mouth dailyHistorical Med      zinc 50 MG TABS tablet Take 50 mg by mouth dailyHistorical Med      Vitamin D, Cholecalciferol, 10 MCG (400 UNIT) TABS Take 1 tablet by mouth dailyHistorical Med      vitamin E 180 MG (400 UNIT) CAPS capsule Take 400 Units by mouth dailyHistorical Med      acetaminophen (TYLENOL) 500 MG tablet Take by mouth daily 2 tabHistorical Med      pregabalin (LYRICA) 75 MG capsule Take 75 mg by mouth nightly. Historical Med      bismuth subsalicylate (PEPTO BISMOL) 262 MG/15ML suspension Take 30 mLs by mouth every 6 hours as needed for IndigestionHistorical Med      ibuprofen (ADVIL;MOTRIN) 400 MG tablet Take 400 mg by mouth every 6 hours as needed for PainHistorical Med      venlafaxine (EFFEXOR-XR) 150 MG XR capsule Take 150 mg by mouth daily Pt takes two pills in am, Disp-60 capsule, R-5Historical Med      clorazepate (TRANXENE) 15 MG tablet Take 15 mg by mouth nightlyHistorical Med      famotidine (PEPCID) 20 MG tablet Take 20 mg by mouth dailyHistorical Med      zolpidem (AMBIEN) 10 MG tablet Take by mouth nightly as needed for SleepHistorical Med             ALLERGIES     has No Known Allergies. HISTORY     She indicated that her father is . She indicated that the status of her sister is unknown. She indicated that her brother is alive. She indicated that the status of her neg hx is unknown.   family history includes Cancer in her sister; Diabetes in her sister;  Heart Attack in her brother; Heart Disease in her father; High Blood Pressure in her brother and sister. SOCIALHISTORY      reports that she quit smoking about 4 months ago. Her smoking use included cigarettes. She smoked 0.50 packs per day. She has never used smokeless tobacco. She reports that she does not drink alcohol and does not use drugs. PHYSICAL EXAM     INITIAL VITALS:  height is 5' 6\" (1.676 m) and weight is 201 lb (91.2 kg). Her oral temperature is 98.2 °F (36.8 °C). Her blood pressure is 167/102 (abnormal) and her pulse is 113 (abnormal). Her respiration is 16 and oxygen saturation is 95%. Physical Exam  Vitals and nursing note reviewed. Constitutional:       General: She is not in acute distress. Appearance: Normal appearance. She is not ill-appearing, toxic-appearing or diaphoretic. Cardiovascular:      Rate and Rhythm: Normal rate and regular rhythm. Pulses: Normal pulses. Heart sounds: Normal heart sounds. Pulmonary:      Effort: Pulmonary effort is normal.      Breath sounds: Normal breath sounds. Musculoskeletal:      Cervical back: Normal range of motion and neck supple. Right foot: Normal pulse. Left foot: Normal.      Comments: Right foot: 2nd digit amputation at the MTP. Incision appears to be healing well with no signs of infection. (SEE PICTURE BELOW)   Skin:     General: Skin is warm and dry. Coloration: Skin is not jaundiced. Findings: No bruising. Neurological:      General: No focal deficit present. Mental Status: She is alert and oriented to person, place, and time. Mental status is at baseline. Psychiatric:         Mood and Affect: Mood normal.         Behavior: Behavior normal.         Thought Content:  Thought content normal.                DIFFERENTIAL DIAGNOSIS:   Differential diagnoses include but are not limited to: Dressing change    DIAGNOSTIC RESULTS     EKG: All EKG's are interpreted by the Emergency Department Physician who either signs or Co-signs this chart in the absence of a cardiologist.  None     RADIOLOGY: non-plain film images(s) such as CT, Ultrasound and MRI are read by the radiologist.  No orders to display         LABS:   Labs Reviewed - No data to display    EMERGENCY DEPARTMENT COURSE:   :    Vitals:    06/17/22 0812   BP: (!) 167/102   Pulse: (!) 113   Resp: 16   Temp: 98.2 °F (36.8 °C)   TempSrc: Oral   SpO2: 95%   Weight: 201 lb (91.2 kg)   Height: 5' 6\" (1.676 m)     Patient was seen history physical exam was performed. See disposition below    MDM:   Patient's right foot was evaluated and revealed a non-infected incision that is healing well from amputation of the 2nd digit at the MTP joint on 06/15. Patient's dressing was changed and she was encouraged to follow up with her podiatrist as scheduled on 06/20. Continue at-home care management per podiatrist instructions. Return to the ED if dressing becomes wet again or if you notice bleeding from the area. Patient verbalizes understanding and agrees with the plan and discharge home. No further questions or concerns. CRITICAL CARE:  None    CONSULTS:  None    PROCEDURES:  None    FINAL IMPRESSION    1.  Visit for wound check     DISPOSITION/PLAN   Discharge    PATIENT REFERRED TO:  Patricia Ford, [address] [facility] [address] [facility] [address] [facility]  [phone]    In 2 days        DISCHARGE MEDICATIONS:  Discharge Medication List as of 6/17/2022  9:09 AM          (Please note that portions of this note were completed with a voice recognitionprogram.  Efforts were made to edit the dictations but occasionally words are mis-transcribed.)    WAI Dangelo, [address]  06/17/22 6859

## 2022-06-17 NOTE — ED TRIAGE NOTES
Patient presents to ER for wound check and dressing change after right foot started to bleed this morning. Patient reports having 2nd toe removed due to overlapping great toe. Patient reports getting foot wet after taking shower and started to bleed. Patient reports she was told to come in if foot started to bleed.

## (undated) DEVICE — BANDAGE COMPR W4INXL5YD BLU LTX COHESIVE SELF ADH EZ TEAR

## (undated) DEVICE — BANDAGE COBAN 4 IN COMPR W4INXL5YD FOAM COHESIVE QUIK STK SELF ADH SFT

## (undated) DEVICE — PREP SOL PVP IODINE 4%  4 OZ/BTL

## (undated) DEVICE — GLOVE ORANGE PI 8   MSG9080

## (undated) DEVICE — NEEDLE HYPO 25GA L1IN PIVOTING SHLD FOR LUERLOCK SYR ECLIPSE

## (undated) DEVICE — SUTURE PROL SZ 4-0 L18IN NONABSORBABLE BLU L19MM PS-2 3/8 8682G

## (undated) DEVICE — SOLUTION IV IRRIG POUR BRL 0.9% SODIUM CHL 2F7124

## (undated) DEVICE — Device

## (undated) DEVICE — IMPREGNATED GAUZE DRESSING: Brand: CUTICERIN 7.5X7.5CM CTN 50

## (undated) DEVICE — PACK PROCEDURE SURG POD SC SRHP LF

## (undated) DEVICE — SOLUTION IRRIG 1500ML STRL H2O USP POUR PLAS BTL

## (undated) DEVICE — Z DISCONTINUED BY MEDLINE USE 2711682 TRAY SKIN PREP DRY W/ PREM GLV

## (undated) DEVICE — SOLUTION SURG PREP POV IOD 7.5% 4 OZ

## (undated) DEVICE — GLOVE SURG SZ 8 L12IN FNGR THK94MIL TRNSLUC YEL LTX HYDRGEL